# Patient Record
Sex: MALE | Race: BLACK OR AFRICAN AMERICAN | NOT HISPANIC OR LATINO | Employment: FULL TIME | URBAN - NONMETROPOLITAN AREA
[De-identification: names, ages, dates, MRNs, and addresses within clinical notes are randomized per-mention and may not be internally consistent; named-entity substitution may affect disease eponyms.]

---

## 2023-01-31 DIAGNOSIS — Z12.11 ENCOUNTER FOR SCREENING COLONOSCOPY FOR NON-HIGH-RISK PATIENT: Primary | ICD-10-CM

## 2023-01-31 RX ORDER — SODIUM CHLORIDE 9 MG/ML
INJECTION, SOLUTION INTRAVENOUS CONTINUOUS
Status: CANCELLED | OUTPATIENT
Start: 2023-01-31

## 2023-01-31 RX ORDER — LIDOCAINE HYDROCHLORIDE 10 MG/ML
1 INJECTION, SOLUTION EPIDURAL; INFILTRATION; INTRACAUDAL; PERINEURAL ONCE AS NEEDED
Status: CANCELLED | OUTPATIENT
Start: 2023-01-31 | End: 2034-06-29

## 2023-01-31 RX ORDER — SODIUM CHLORIDE 0.9 % (FLUSH) 0.9 %
10 SYRINGE (ML) INJECTION
Status: CANCELLED | OUTPATIENT
Start: 2023-01-31

## 2023-02-06 ENCOUNTER — HOSPITAL ENCOUNTER (OUTPATIENT)
Dept: PREADMISSION TESTING | Facility: HOSPITAL | Age: 48
Discharge: HOME | End: 2023-02-06
Attending: SURGERY
Payer: COMMERCIAL

## 2023-02-06 ENCOUNTER — HOSPITAL ENCOUNTER (OUTPATIENT)
Dept: PULMONOLOGY | Facility: HOSPITAL | Age: 48
Discharge: HOME | End: 2023-02-06
Attending: SURGERY
Payer: COMMERCIAL

## 2023-02-06 VITALS — WEIGHT: 170 LBS | HEIGHT: 67 IN | BODY MASS INDEX: 26.68 KG/M2

## 2023-02-06 DIAGNOSIS — Z12.11 ENCOUNTER FOR SCREENING COLONOSCOPY FOR NON-HIGH-RISK PATIENT: ICD-10-CM

## 2023-02-06 PROCEDURE — 93010 ELECTROCARDIOGRAM REPORT: CPT | Mod: ,,, | Performed by: INTERNAL MEDICINE

## 2023-02-06 PROCEDURE — 93010 EKG 12-LEAD: ICD-10-PCS | Mod: ,,, | Performed by: INTERNAL MEDICINE

## 2023-02-06 PROCEDURE — 93005 ELECTROCARDIOGRAM TRACING: CPT

## 2023-02-06 RX ORDER — CHOLECALCIFEROL (VITAMIN D3) 25 MCG
1000 TABLET ORAL DAILY
COMMUNITY

## 2023-02-06 RX ORDER — VITAMIN B COMPLEX
1 CAPSULE ORAL DAILY
COMMUNITY

## 2023-02-09 ENCOUNTER — ANESTHESIA EVENT (OUTPATIENT)
Dept: ENDOSCOPY | Facility: HOSPITAL | Age: 48
End: 2023-02-09
Payer: COMMERCIAL

## 2023-02-09 NOTE — ANESTHESIA PREPROCEDURE EVALUATION
02/09/2023  Channing Diaz Sr. is a 47 y.o., male.      Pre-op Assessment    I have reviewed the Patient Summary Reports.    I have reviewed the NPO Status.   I have reviewed the Medications.     Review of Systems  Anesthesia Hx:  No problems with previous Anesthesia  Denies Family Hx of Anesthesia complications.   Denies Personal Hx of Anesthesia complications.   Social:  Non-Smoker    Cardiovascular:  Cardiovascular Normal  ECG has been reviewed.    Pulmonary:  Pulmonary Normal    Renal/:  Renal/ Normal     Hepatic/GI:  Hepatic/GI Normal    Neurological:  Neurology Normal    Endocrine:  Endocrine Normal       Lab Results   Component Value Date    WBC 3.90 02/06/2023    HGB 14.5 02/06/2023    HCT 43.7 02/06/2023    MCV 88 02/06/2023     02/06/2023     CMP  Sodium   Date Value Ref Range Status   02/06/2023 143 136 - 145 mmol/L Final     Potassium   Date Value Ref Range Status   02/06/2023 4.0 3.5 - 5.1 mmol/L Final     Chloride   Date Value Ref Range Status   02/06/2023 108 95 - 110 mmol/L Final     CO2   Date Value Ref Range Status   02/06/2023 31 (H) 23 - 29 mmol/L Final     Glucose   Date Value Ref Range Status   02/06/2023 102 70 - 110 mg/dL Final     BUN   Date Value Ref Range Status   02/06/2023 14 6 - 20 mg/dL Final     Creatinine   Date Value Ref Range Status   02/06/2023 1.3 0.5 - 1.4 mg/dL Final     Calcium   Date Value Ref Range Status   02/06/2023 8.6 (L) 8.7 - 10.5 mg/dL Final     Total Protein   Date Value Ref Range Status   02/06/2023 6.8 6.0 - 8.4 g/dL Final     Albumin   Date Value Ref Range Status   02/06/2023 3.4 (L) 3.5 - 5.2 g/dL Final     Total Bilirubin   Date Value Ref Range Status   02/06/2023 0.4 0.1 - 1.0 mg/dL Final     Comment:     For infants and newborns, interpretation of results should be based  on gestational age, weight and in agreement with  clinical  observations.    Premature Infant recommended reference ranges:  Up to 24 hours.............<8.0 mg/dL  Up to 48 hours............<12.0 mg/dL  3-5 days..................<15.0 mg/dL  6-29 days.................<15.0 mg/dL    For patients on Eltrombopag therapy, use of Dimension [place] TBIL is   not   recommended.       Alkaline Phosphatase   Date Value Ref Range Status   02/06/2023 62 55 - 135 U/L Final     AST   Date Value Ref Range Status   02/06/2023 21 10 - 40 U/L Final     ALT   Date Value Ref Range Status   02/06/2023 26 10 - 44 U/L Final     Anion Gap   Date Value Ref Range Status   02/06/2023 4 (L) 8 - 16 mmol/L Final     eGFR   Date Value Ref Range Status   02/06/2023 >60.0 >60 mL/min/1.73 m^2 Final       Physical Exam  General: Well nourished    Airway:  Mallampati: III / III  Mouth Opening: Normal  TM Distance: Normal  Tongue: Normal  Neck ROM: Normal ROM    Dental:  Intact    Chest/Lungs:  Clear to auscultation    Heart:  Rate: Normal  Rhythm: Regular Rhythm  Sounds: Normal        Anesthesia Plan  Type of Anesthesia, risks & benefits discussed:    Anesthesia Type: MAC  Intra-op Monitoring Plan: Standard ASA Monitors  Post Op Pain Control Plan: multimodal analgesia  Induction:  IV  Airway Plan: Direct  Informed Consent: Informed consent signed with the Patient and all parties understand the risks and agree with anesthesia plan.  All questions answered.   ASA Score: 2  Day of Surgery Review of History & Physical: I have interviewed and examined the patient. I have reviewed the patient's H&P dated: There are no significant changes.     Ready For Surgery From Anesthesia Perspective.     .

## 2023-02-10 PROBLEM — Z12.11 ENCOUNTER FOR SCREENING COLONOSCOPY FOR NON-HIGH-RISK PATIENT: Status: ACTIVE | Noted: 2023-02-10

## 2023-02-10 NOTE — H&P
[facility]  General Surgery  History & Physical    Patient Name: Channing Diaz Sr.  MRN: [id number]  Admission Date: (Not on file)  Attending Physician: Joo Samano MD   Primary Care Provider: Primary Doctor No    Patient information was obtained from patient and past medical records.     Subjective:     Chief Complaint/Reason for Admission:  Screening colonoscopy  male  History of Present Illness:  Patient is a 47 y.o. male, referred by Lilly Sargent NP, for screening colonoscopy.  This is the 1st time the patient is having the procedure done, he denies any symptoms and has no family history of colon cancer.    No current facility-administered medications on file prior to encounter.     No current outpatient medications on file prior to encounter.       Review of patient's allergies indicates:  No Known Allergies    No past medical history on file.  Past Surgical History:   Procedure Laterality Date    APPENDECTOMY       Family History       Problem Relation (Age of Onset)    Irregular heart beat Mother    Prostate cancer Father          Tobacco Use    Smoking status: Never    Smokeless tobacco: Not on file   Substance and Sexual Activity    Alcohol use: Yes     Comment: occ    Drug use: Never    Sexual activity: Not on file     Review of Systems   Gastrointestinal: Negative.    All other systems reviewed and are negative.  Objective:     Vital Signs (Most Recent):    Vital Signs (24h Range):           There is no height or weight on file to calculate BMI.    Physical Exam  Constitutional:       Appearance: Normal appearance.   HENT:      Head: Normocephalic.      Nose: Nose normal.      Mouth/Throat:      Mouth: Mucous membranes are moist.   Eyes:      Extraocular Movements: Extraocular movements intact.   Cardiovascular:      Rate and Rhythm: Regular rhythm.   Pulmonary:      Breath sounds: Normal breath sounds.   Abdominal:      General: Abdomen is flat. There is no distension.      Palpations:  Abdomen is soft. There is no mass.      Tenderness: There is no abdominal tenderness.      Hernia: No hernia is present.   Musculoskeletal:         General: Normal range of motion.      Cervical back: Neck supple.   Lymphadenopathy:      Cervical: No cervical adenopathy.   Skin:     General: Skin is warm and dry.      Coloration: Skin is not jaundiced.   Neurological:      General: No focal deficit present.      Mental Status: He is alert and oriented to person, place, and time.   Psychiatric:         Mood and Affect: Mood normal.         Behavior: Behavior normal.       Significant Labs:  I have reviewed all pertinent lab results within the past 24 hours.    Significant Diagnostics:  I have reviewed all pertinent imaging results/findings within the past 24 hours.    Assessment/Plan:     Active Diagnoses:    Diagnosis Date Noted POA    PRINCIPAL PROBLEM:  Encounter for screening colonoscopy for non-high-risk patient [Z12.11] 02/10/2023 Not Applicable      Problems Resolved During this Admission:     VTE Risk Mitigation (From admission, onward)      None            Joo Samano MD  General Surgery  [facility]

## 2023-02-13 ENCOUNTER — ANESTHESIA (OUTPATIENT)
Dept: ENDOSCOPY | Facility: HOSPITAL | Age: 48
End: 2023-02-13
Payer: COMMERCIAL

## 2023-02-13 ENCOUNTER — HOSPITAL ENCOUNTER (OUTPATIENT)
Facility: HOSPITAL | Age: 48
Discharge: HOME | End: 2023-02-13
Attending: SURGERY | Admitting: SURGERY
Payer: COMMERCIAL

## 2023-02-13 VITALS
DIASTOLIC BLOOD PRESSURE: 77 MMHG | TEMPERATURE: 98 F | SYSTOLIC BLOOD PRESSURE: 110 MMHG | RESPIRATION RATE: 20 BRPM | HEART RATE: 77 BPM | OXYGEN SATURATION: 97 %

## 2023-02-13 DIAGNOSIS — Z12.11 ENCOUNTER FOR SCREENING COLONOSCOPY FOR NON-HIGH-RISK PATIENT: Primary | ICD-10-CM

## 2023-02-13 PROCEDURE — G0121 COLON CA SCRN NOT HI RSK IND: HCPCS | Performed by: SURGERY

## 2023-02-13 PROCEDURE — 37000009 HC ANESTHESIA EA ADD 15 MINS: Performed by: SURGERY

## 2023-02-13 PROCEDURE — 37000008 HC ANESTHESIA 1ST 15 MINUTES: Performed by: SURGERY

## 2023-02-13 PROCEDURE — 25000003 PHARM REV CODE 250: Performed by: SURGERY

## 2023-02-13 RX ORDER — LIDOCAINE HYDROCHLORIDE 10 MG/ML
INJECTION, SOLUTION INTRAVENOUS
Status: DISCONTINUED | OUTPATIENT
Start: 2023-02-13 | End: 2023-02-13

## 2023-02-13 RX ORDER — PROPOFOL 10 MG/ML
VIAL (ML) INTRAVENOUS
Status: DISCONTINUED | OUTPATIENT
Start: 2023-02-13 | End: 2023-02-13

## 2023-02-13 RX ORDER — SODIUM CHLORIDE 0.9 % (FLUSH) 0.9 %
10 SYRINGE (ML) INJECTION
Status: DISCONTINUED | OUTPATIENT
Start: 2023-02-13 | End: 2023-02-13 | Stop reason: HOSPADM

## 2023-02-13 RX ORDER — LIDOCAINE HYDROCHLORIDE 10 MG/ML
1 INJECTION, SOLUTION EPIDURAL; INFILTRATION; INTRACAUDAL; PERINEURAL ONCE AS NEEDED
Status: DISCONTINUED | OUTPATIENT
Start: 2023-02-13 | End: 2023-02-13 | Stop reason: HOSPADM

## 2023-02-13 RX ORDER — SODIUM CHLORIDE 9 MG/ML
INJECTION, SOLUTION INTRAVENOUS CONTINUOUS
Status: DISCONTINUED | OUTPATIENT
Start: 2023-02-13 | End: 2023-02-13 | Stop reason: HOSPADM

## 2023-02-13 RX ADMIN — Medication 30 MG: at 09:02

## 2023-02-13 RX ADMIN — Medication 30 MG: at 08:02

## 2023-02-13 RX ADMIN — SODIUM CHLORIDE: 9 INJECTION, SOLUTION INTRAVENOUS at 08:02

## 2023-02-13 RX ADMIN — LIDOCAINE HYDROCHLORIDE 50 MG: 10 INJECTION, SOLUTION INTRAVENOUS at 08:02

## 2023-02-13 RX ADMIN — Medication 120 MG: at 08:02

## 2023-02-13 NOTE — DISCHARGE SUMMARY
[facility] - Endoscopy  Discharge Note  Short Stay    Procedure(s) (LRB):  COLONOSCOPY (N/A)      OUTCOME: Patient tolerated treatment/procedure well without complication and is now ready for discharge.    DISPOSITION: Home or Self Care    FINAL DIAGNOSIS:  1. Encounter for screening colonoscopy for non-high-risk patient 2. Normal colonoscopy    FOLLOWUP:  Patient is to follow up in 10 years for another colonoscopy    DISCHARGE INSTRUCTIONS:  No discharge procedures on file.      Clinical Reference Documents Added to Patient Instructions         Document    COLONOSCOPY DISCHARGE INSTRUCTIONS (ENGLISH)            TIME SPENT ON DISCHARGE:  15 minutes

## 2023-02-13 NOTE — PLAN OF CARE
Was not able to give patient written discharge instructions because [facility] was down. Dr. Samano wasn't able to enter Discharge instructions for the same reason but called to let us know his Colonoscopy was normal and he could be discharged and follow up in 10 years for a repeat colonoscopy. Verbal discharge instructions given to the patient and his mother and they verbalized understanding.  Instructed to not drink alcohol or drive a vehicle today.  Instructed to call Dr. Samano or come to the ER for severe pain, persistent nausea and vomiting, Temperature greater than 100.4 or rectal bleeding.

## 2023-02-13 NOTE — TRANSFER OF CARE
Anesthesia Transfer of Care Note    Patient: Channing Diaz     Procedure(s) Performed: Procedure(s) (LRB):  COLONOSCOPY (N/A)    Patient location: GI    Anesthesia Type: MAC    Transport from OR: Transported from OR on room air with adequate spontaneous ventilation    Post pain: adequate analgesia    Post assessment: no apparent anesthetic complications    Post vital signs: stable    Level of consciousness: awake    Nausea/Vomiting: no nausea/vomiting    Complications: none    Transfer of care protocol was followed      Last vitals:   /74  RR 16  SPO2 100  T 36.8  HR 70

## 2023-02-13 NOTE — OP NOTE
[facility] - Endoscopy  Colonoscopy Procedure  Operative Note    SUMMARY     Date of Procedure: 2/13/2023     Procedure: Procedure(s) (LRB):  COLONOSCOPY (N/A)    Surgeon(s) and Role:     * Joo Samano MD - Primary    Assisting Surgeon: None     Patient location: PACU    Pre-Operative Diagnosis: Encounter for screening colonoscopy for non-high-risk patient [Z12.11]    Post-Operative Diagnosis: Post-Op Diagnosis Codes:     * Encounter for screening colonoscopy for non-high-risk patient [Z12.11]     Indications:  Screening colonoscopy     Anesthesia:  Mac        Procedure:                  The patient was brought in to the endoscopy suite where the risks, benefits, and alternatives of the procedure were described.  The patient was given the opportunity to ask questions and then signed informed consent.  Patient was positioned in the left lateral decubitus position, continuous monitoring was initiated, and supplemental oxygen was provided via nasal cannula.  Adequate sedation was achieved with the above mentioned medications and then titrated during the entire procedure.  Digital rectal exam was performed.  Under direct visualization the colonoscope was introduced through the anus in to the rectum.  The scope was then advanced to the cecum, which was identified by the ileocecal valve and appendiceal orifice.  Scope was then withdrawn and the mucosa was carefully examined in a circular fashion.  The entire colonic mucosa was examined, including the rectum with retroflexion.  Air was evacuated from the colon and the procedure was terminated.  The patient tolerated the procedure well and was able to be transferred to the recovery area in stable condition.    Findings:                 Digital rectal examination:  Rectal exam showed normal sphincter tone with no masses palpated.  There was no blood on the glove.                      Rectum:  Rectal mucosa appeared unremarkable                    Sigmoid:  Sigmoid colon was  normal        Descending:  Descending colon showed no abnormalities         Transverse:  Transverse colon was normal         Ascending:  Ascending colon showed no abnormalities        Cecum:  Cecal mucosa appeared unremarkable.  Ileocecal valve and appendiceal orifice were normal.  We entered into the terminal ileum.        Terminal Ileum:  Terminal ileum mucosa was normal.     Specimens:   Specimens (From admission, onward)      None              Estimated Blood Loss (EBL): * No values recorded between 2/13/2023  8:42 AM and 2/13/2023  9:17 AM *     Complications: No     Diagnostic Impression:  1. Screening colonoscopy 2.  Normal colonoscopy      Recommendations: Discharge patient to home.    Disposition: PACU - hemodynamically stable.     Attestation: I performed the procedure.        Follow Up:             No future appointments.        Joo Samano MD  2/13/2023

## 2023-02-13 NOTE — ANESTHESIA POSTPROCEDURE EVALUATION
Anesthesia Post Evaluation    Patient: Channing Diaz     Procedure(s) Performed: Procedure(s) (LRB):  COLONOSCOPY (N/A)    Final Anesthesia Type: MAC      Patient location during evaluation: OPS  Patient participation: Yes- Able to Participate  Level of consciousness: awake  Post-procedure vital signs: reviewed and stable  Pain management: adequate  Airway patency: patent    PONV status at discharge: No PONV  Anesthetic complications: no      Cardiovascular status: blood pressure returned to baseline  Respiratory status: spontaneous ventilation  Hydration status: euvolemic  Follow-up not needed.          Vitals Value Taken Time   /77 02/13/23 0925   Temp 36.4 °C (97.5 °F) 02/13/23 0925   Pulse 77 02/13/23 0925   Resp 20 02/13/23 0925   SpO2 97 % 02/13/23 0925         No case tracking events are documented in the log.      Pain/Gaby Score: Gaby Score: 10 (2/13/2023  9:25 AM)

## 2023-02-13 NOTE — DISCHARGE INSTRUCTIONS
FOLLOW UP WITH A COLONOSCOPY IN 10 YEARS    NO DRIVING OR DRINKING ALCOHOL FOR 24 HOURS.    CALL DR BRONSON'S OFFICE FOR ANY QUESTIONS OR CONCERNS.  REPORT TO THE ER IF URGENT.    THANK YOU FOR CHOOSING [facility]!     CALL DR. BRONSON OR COME TO THE ER FOR ANY PROBLEM WITH PERSISTENT NAUSEA AND VOMITING,  RECTAL BLEEDING, SEVERE ABDOMINAL PAIN OR TEMPERATURE OVER 100.4

## (undated) DEVICE — SPONGE DRY VIA GREEN

## (undated) DEVICE — SYR SLIP TIP 60 CC DISP

## (undated) DEVICE — UNDERPAD DISPOSABLE 30X30IN

## (undated) DEVICE — ELECTRODE FOAM 535 TEARDROP

## (undated) DEVICE — LINER SUCTION CANNISTER REGUGA

## (undated) DEVICE — KIT BIOGUARD AIR WTR SUC VALVE

## (undated) DEVICE — BASIN EMESIS GRAPHITE 500ML

## (undated) DEVICE — CONNECTOR TORRENT SCP OLYMPUS

## (undated) DEVICE — GOWN SURGICAL BRTHBL XL

## (undated) DEVICE — TUBING OXYGEN CONNECT BUBBLE

## (undated) DEVICE — TUBE SUC UNIVERSAL .25XIN 6FT

## (undated) DEVICE — SOL IRRI STRL WATER 1000ML

## (undated) DEVICE — KIT VIA CUSTOM PROCEDURE

## (undated) DEVICE — TUBING SUC UNIV W/CONN 12FT

## (undated) DEVICE — CANNULA SUPERSOFT CO2 M AD 7FT